# Patient Record
Sex: FEMALE | Race: WHITE | NOT HISPANIC OR LATINO | URBAN - METROPOLITAN AREA
[De-identification: names, ages, dates, MRNs, and addresses within clinical notes are randomized per-mention and may not be internally consistent; named-entity substitution may affect disease eponyms.]

---

## 2018-04-18 PROBLEM — Z00.00 ENCOUNTER FOR PREVENTIVE HEALTH EXAMINATION: Status: ACTIVE | Noted: 2018-04-18

## 2018-04-19 ENCOUNTER — OUTPATIENT (OUTPATIENT)
Dept: OUTPATIENT SERVICES | Facility: HOSPITAL | Age: 78
LOS: 1 days | End: 2018-04-19
Payer: MEDICARE

## 2018-04-19 ENCOUNTER — APPOINTMENT (OUTPATIENT)
Dept: ULTRASOUND IMAGING | Facility: CLINIC | Age: 78
End: 2018-04-19
Payer: MEDICARE

## 2018-04-19 DIAGNOSIS — Z00.8 ENCOUNTER FOR OTHER GENERAL EXAMINATION: ICD-10-CM

## 2018-04-19 PROCEDURE — 76882 US LMTD JT/FCL EVL NVASC XTR: CPT

## 2018-04-19 PROCEDURE — 76882 US LMTD JT/FCL EVL NVASC XTR: CPT | Mod: 26,LT

## 2019-12-27 ENCOUNTER — OUTPATIENT (OUTPATIENT)
Dept: OUTPATIENT SERVICES | Facility: HOSPITAL | Age: 79
LOS: 1 days | End: 2019-12-27

## 2019-12-27 VITALS
SYSTOLIC BLOOD PRESSURE: 130 MMHG | HEART RATE: 76 BPM | TEMPERATURE: 97 F | HEIGHT: 64.5 IN | DIASTOLIC BLOOD PRESSURE: 70 MMHG | OXYGEN SATURATION: 98 % | RESPIRATION RATE: 16 BRPM | WEIGHT: 160.94 LBS

## 2019-12-27 DIAGNOSIS — M20.11 HALLUX VALGUS (ACQUIRED), RIGHT FOOT: ICD-10-CM

## 2019-12-27 DIAGNOSIS — M21.619 BUNION OF UNSPECIFIED FOOT: ICD-10-CM

## 2019-12-27 DIAGNOSIS — I10 ESSENTIAL (PRIMARY) HYPERTENSION: ICD-10-CM

## 2019-12-27 DIAGNOSIS — Z90.49 ACQUIRED ABSENCE OF OTHER SPECIFIED PARTS OF DIGESTIVE TRACT: Chronic | ICD-10-CM

## 2019-12-27 DIAGNOSIS — Z90.710 ACQUIRED ABSENCE OF BOTH CERVIX AND UTERUS: Chronic | ICD-10-CM

## 2019-12-27 LAB
ANION GAP SERPL CALC-SCNC: 12 MMO/L — SIGNIFICANT CHANGE UP (ref 7–14)
BUN SERPL-MCNC: 19 MG/DL — SIGNIFICANT CHANGE UP (ref 7–23)
CALCIUM SERPL-MCNC: 10.5 MG/DL — SIGNIFICANT CHANGE UP (ref 8.4–10.5)
CHLORIDE SERPL-SCNC: 100 MMOL/L — SIGNIFICANT CHANGE UP (ref 98–107)
CO2 SERPL-SCNC: 27 MMOL/L — SIGNIFICANT CHANGE UP (ref 22–31)
CREAT SERPL-MCNC: 0.71 MG/DL — SIGNIFICANT CHANGE UP (ref 0.5–1.3)
GLUCOSE SERPL-MCNC: 90 MG/DL — SIGNIFICANT CHANGE UP (ref 70–99)
HCT VFR BLD CALC: 45.1 % — HIGH (ref 34.5–45)
HGB BLD-MCNC: 14.7 G/DL — SIGNIFICANT CHANGE UP (ref 11.5–15.5)
MCHC RBC-ENTMCNC: 29.6 PG — SIGNIFICANT CHANGE UP (ref 27–34)
MCHC RBC-ENTMCNC: 32.6 % — SIGNIFICANT CHANGE UP (ref 32–36)
MCV RBC AUTO: 90.9 FL — SIGNIFICANT CHANGE UP (ref 80–100)
NRBC # FLD: 0 K/UL — SIGNIFICANT CHANGE UP (ref 0–0)
PLATELET # BLD AUTO: 207 K/UL — SIGNIFICANT CHANGE UP (ref 150–400)
PMV BLD: 11.5 FL — SIGNIFICANT CHANGE UP (ref 7–13)
POTASSIUM SERPL-MCNC: 4 MMOL/L — SIGNIFICANT CHANGE UP (ref 3.5–5.3)
POTASSIUM SERPL-SCNC: 4 MMOL/L — SIGNIFICANT CHANGE UP (ref 3.5–5.3)
RBC # BLD: 4.96 M/UL — SIGNIFICANT CHANGE UP (ref 3.8–5.2)
RBC # FLD: 13.5 % — SIGNIFICANT CHANGE UP (ref 10.3–14.5)
SODIUM SERPL-SCNC: 139 MMOL/L — SIGNIFICANT CHANGE UP (ref 135–145)
WBC # BLD: 6.27 K/UL — SIGNIFICANT CHANGE UP (ref 3.8–10.5)
WBC # FLD AUTO: 6.27 K/UL — SIGNIFICANT CHANGE UP (ref 3.8–10.5)

## 2019-12-27 NOTE — H&P PST ADULT - NSANTHBPHIGHRD_ENT_A_CORE
Last 5 Patient Entered Readings                                      Current 30 Day Average: 147/86     Recent Readings 7/22/2019 7/21/2019 7/21/2019 7/21/2019 7/16/2019    SBP (mmHg) 162 152 162 155 142    DBP (mmHg) 89 86 96 87 81    Pulse 66 63 73 75 71        Hypertension Medications     atenolol (TENORMIN) 50 MG tablet Take 1.5 tablets (75 mg total) by mouth once daily.    chlorthalidone (HYGROTEN) 25 MG Tab TAKE 1/2 (ONE-HALF) TABLET BY MOUTH ONCE DAILY FOR BLOOD PRESSURE    losartan (COZAAR) 100 MG tablet TAKE 1 TABLET BY MOUTH ONCE DAILY     Patient spoke with health  today and had a couple of medication- related questions. I called patient to follow up. He is concerned that BP is trending up. He says that BP readings taken on ihealth machine are comparable to readings taken on a different machine. Patient reports that he hasn't used his CPAP machine in a while because he forgets to. He says that he has been very stressed at work. He also says he has been struggling to follow a low sodium diet.     Patient says he does have headaches. He somewhat contributes this to stress and says that the discomfort is located in front area of his head, nose, eyes. He says that the headaches started last year so he doesn't necessarily think it is due to chlorthalidone or any recent BP medication changes.     1) 30-day BP average exceeds goal of <130/<80. Continue atenolol and chlorthalidone. Stop losartan 100 mg QD. Start valsartan 320 mg QD  2) Patient knows to contact me with any non-urgent clinical changes or concerns. Go to ED or call [facility] for emergencies.   3) Will defer lifestyle counseling to digital medicine health .   4) Will continue to follow up.       Briana Daniel, Pharm.D.   Digital Medicine Clinical Pharmacist  [phone]        
Yes

## 2019-12-27 NOTE — H&P PST ADULT - NS SC CAGE ALCOHOL CUT DOWN
Did not receive a return call today.  
Do you want us to try again or discharge?  
LMOM for patient to call. Attempted to contact for pill count/UDS.  
no

## 2019-12-27 NOTE — H&P PST ADULT - MALLAMPATI CLASS
Class II - visualization of the soft palate, fauces, and uvula/with phonation Class III - visualization of the soft palate and the base of the uvula/with phonation

## 2019-12-27 NOTE — H&P PST ADULT - MUSCULOSKELETAL
details… detailed exam joint swelling/right foot pain with pressure applied to bunion on 2nd toe/normal strength/decreased ROM

## 2019-12-27 NOTE — H&P PST ADULT - HISTORY OF PRESENT ILLNESS
Pt is a 79 yr old female scheduled for Right Akin Bunionectomy Osteotomy 2nd Metatarsal Plantar Slate Repair 2nd toe with Dr Martinez 1/3/20. Pt c/o of pain with pressure and ambulation for many years that has gotten worse.

## 2019-12-27 NOTE — H&P PST ADULT - NSICDXPROBLEM_GEN_ALL_CORE_FT
PROBLEM DIAGNOSES  Problem: Bunion  Assessment and Plan: Pt scheduled for surgery and preop instructions including instructions for taking Famotidine and for Chlorhexidine use in showering on the day of surgery, given verbally and with use of  written materials, and patient confirming understanding of such instructions using  teach back   method.  OR Booking notified of risk for sleep apnea     Problem: HTN (hypertension)  Assessment and Plan: Pt to take losartan am DOS

## 2020-01-02 ENCOUNTER — TRANSCRIPTION ENCOUNTER (OUTPATIENT)
Age: 80
End: 2020-01-02

## 2020-01-03 ENCOUNTER — RESULT REVIEW (OUTPATIENT)
Age: 80
End: 2020-01-03

## 2020-01-03 ENCOUNTER — OUTPATIENT (OUTPATIENT)
Dept: OUTPATIENT SERVICES | Facility: HOSPITAL | Age: 80
LOS: 1 days | Discharge: HOME | End: 2020-01-03
Payer: MEDICARE

## 2020-01-03 VITALS — TEMPERATURE: 98 F | HEART RATE: 72 BPM

## 2020-01-03 VITALS
SYSTOLIC BLOOD PRESSURE: 127 MMHG | WEIGHT: 160.94 LBS | HEART RATE: 75 BPM | TEMPERATURE: 97 F | RESPIRATION RATE: 16 BRPM | HEIGHT: 64.5 IN | DIASTOLIC BLOOD PRESSURE: 71 MMHG | OXYGEN SATURATION: 97 %

## 2020-01-03 DIAGNOSIS — Z90.710 ACQUIRED ABSENCE OF BOTH CERVIX AND UTERUS: Chronic | ICD-10-CM

## 2020-01-03 DIAGNOSIS — Z90.49 ACQUIRED ABSENCE OF OTHER SPECIFIED PARTS OF DIGESTIVE TRACT: Chronic | ICD-10-CM

## 2020-01-03 DIAGNOSIS — M20.11 HALLUX VALGUS (ACQUIRED), RIGHT FOOT: ICD-10-CM

## 2020-01-03 PROCEDURE — 88311 DECALCIFY TISSUE: CPT | Mod: 26

## 2020-01-03 PROCEDURE — 88304 TISSUE EXAM BY PATHOLOGIST: CPT | Mod: 26

## 2020-01-03 RX ORDER — MILK THISTLE 180 MG
1 CAPSULE ORAL
Qty: 0 | Refills: 0 | DISCHARGE

## 2020-01-03 RX ORDER — CEFAZOLIN SODIUM 1 G
2000 VIAL (EA) INJECTION ONCE
Refills: 0 | Status: DISCONTINUED | OUTPATIENT
Start: 2020-01-03 | End: 2020-02-05

## 2020-01-03 RX ORDER — IRBESARTAN 75 MG/1
1 TABLET ORAL
Qty: 0 | Refills: 0 | DISCHARGE

## 2020-01-03 RX ORDER — ROSUVASTATIN CALCIUM 5 MG/1
1 TABLET ORAL
Qty: 0 | Refills: 0 | DISCHARGE

## 2020-01-03 NOTE — ASU DISCHARGE PLAN (ADULT/PEDIATRIC) - PROCEDURE
Status Post RIGHT foot Modified Soni, Akin osteotomy and 2nd digit arthroplasty and Weil osteotomy w/ plantar plate repai

## 2020-01-03 NOTE — ASU DISCHARGE PLAN (ADULT/PEDIATRIC) - FOLLOW UP APPOINTMENTS
911 or go to the nearest Emergency Room [facility] ([facility]):

## 2020-01-03 NOTE — ASU DISCHARGE PLAN (ADULT/PEDIATRIC) - ACTIVITY LEVEL
No heavy lifting/Weight bearing as tolerated/No tub baths/No sports/gym/No excercise/Elevate extremity

## 2020-01-03 NOTE — ASU DISCHARGE PLAN (ADULT/PEDIATRIC) - CALL YOUR DOCTOR IF YOU HAVE ANY OF THE FOLLOWING:
Pain not relieved by Medications/Nausea and vomiting that does not stop/Wound/Surgical Site with redness, or foul smelling discharge or pus/Swelling that gets worse/Fever greater than (need to indicate Fahrenheit or Celsius)/Bleeding that does not stop/Numbness, tingling, color or temperature change to extremity Nausea and vomiting that does not stop/Pain not relieved by Medications/Inability to tolerate liquids or foods/Fever greater than (need to indicate Fahrenheit or Celsius)/Wound/Surgical Site with redness, or foul smelling discharge or pus/Swelling that gets worse/Bleeding that does not stop/Numbness, tingling, color or temperature change to extremity

## 2020-01-09 LAB — SURGICAL PATHOLOGY STUDY: SIGNIFICANT CHANGE UP

## 2022-11-21 NOTE — H&P PST ADULT - MOUTH
Discussed results, patient states that he is out of his medication so he needs a refill. New script sent for both 200 and 25 mcg, and discussed with pharmacy that he needs both.    normal mouth and gums/moist

## 2024-04-05 NOTE — PACU DISCHARGE NOTE - NSPTMEETSDISCHCRITERIADT_GEN_A_CORE
[facility] CLINICAL PHARMACY REVIEW - [employer] Program    RUSS Travis is a 39 y.o. female currently identified being enrolled in the [employer] diabetes program.  All patients will be connected with a personalized health  through the [employer] alex and eligible to receive 3, and possibly up to 6 months of Dexcom G7 system for free.    By participating in the  program, the patient will:  Create daily habits that improve life and flourishing  Have access to personalized content, insights, and their own certified diabetes educator   Earn rewards for feedback    This patient is eligible to receive an initial 3 month supply of Dexcom G7 Sensors for 0$ with a prescription. Prescription orders will be pended for:  Dexcom G7 Sensors: Qty-9 with 0 refill s    Faxing request to PCP- [facility]      Thank you  BRE Hall, PharmD, [facility]  Ambulatory Care Clinical Pharmacist- [facility] Clinical Pharmacy  Department, toll free: [phone]    For Pharmacy Admin Tracking Only    Program: [email or web address]  CPA in place:  No  Recommendation Provided To: Provider: 1 via Fax sent to office  Intervention Detail: New Rx: 1, reason: Patient Preference  Intervention Accepted By: Provider: 1  Gap Closed?: Yes   Time Spent (min): 20     03-Jan-2020 16:40

## 2025-02-19 PROBLEM — E66.9 OBESITY, UNSPECIFIED: Chronic | Status: ACTIVE | Noted: 2019-12-27

## 2025-02-19 PROBLEM — I10 ESSENTIAL (PRIMARY) HYPERTENSION: Chronic | Status: ACTIVE | Noted: 2019-12-27

## 2025-02-19 PROBLEM — M21.619 BUNION OF UNSPECIFIED FOOT: Chronic | Status: ACTIVE | Noted: 2019-12-27

## 2025-02-26 ENCOUNTER — OUTPATIENT (OUTPATIENT)
Dept: OUTPATIENT SERVICES | Facility: HOSPITAL | Age: 85
LOS: 1 days | End: 2025-02-26
Payer: MEDICARE

## 2025-02-26 DIAGNOSIS — R13.19 OTHER DYSPHAGIA: ICD-10-CM

## 2025-02-26 DIAGNOSIS — Z90.49 ACQUIRED ABSENCE OF OTHER SPECIFIED PARTS OF DIGESTIVE TRACT: Chronic | ICD-10-CM

## 2025-02-26 DIAGNOSIS — Z90.710 ACQUIRED ABSENCE OF BOTH CERVIX AND UTERUS: Chronic | ICD-10-CM

## 2025-02-26 PROCEDURE — 74220 X-RAY XM ESOPHAGUS 1CNTRST: CPT

## 2025-02-26 PROCEDURE — 74220 X-RAY XM ESOPHAGUS 1CNTRST: CPT | Mod: 26

## 2025-02-27 DIAGNOSIS — R13.19 OTHER DYSPHAGIA: ICD-10-CM
